# Patient Record
(demographics unavailable — no encounter records)

---

## 2024-11-05 NOTE — DISCUSSION/SUMMARY
[FreeTextEntry1] : 10 yo well, nml growth, ADHD, doing well on focalin improving toe walking, no longer receives PT evaluation for fine motor delays fasting labs ordered  vitamin D rx ADHD evaluation, sleep issues discussed, good appetite, focalin 15 mg in am, no need for additional in afternoon Continue balanced diet with all food groups. Brush teeth twice a day with toothbrush. Recommend visit to dentist. Help child to maintain consistent daily routines and sleep schedule. School discussed. Ensure home is safe. Teach child about personal safety. Use consistent, positive discipline. Limit screen time to no more than 2 hours per day. Encourage physical activity. Child needs to ride in a belt-positioning booster seat until  4 feet 9 inches has been reached and are between 8 and 12 years of age.   Return 1 year for routine well child check.

## 2024-11-05 NOTE — HISTORY OF PRESENT ILLNESS
[Yes] : Patient goes to dentist yearly [Premenarche] : premenarche [Eats meals with family] : eats meals with family [Has family members/adults to turn to for help] : has family members/adults to turn to for help [Is permitted and is able to make independent decisions] : Is permitted and is able to make independent decisions [Grade: ____] : Grade: [unfilled] [Normal Performance] : normal performance [Normal Behavior/Attention] : normal behavior/attention [Normal Homework] : normal homework [Eats regular meals including adequate fruits and vegetables] : eats regular meals including adequate fruits and vegetables [Drinks non-sweetened liquids] : drinks non-sweetened liquids  [Has friends] : has friends [Sleep Concerns] : no sleep concerns [de-identified] : reading extra help, OT weekly, doing well focalin 15 mg in am, not needing pm dose [de-identified] : can take 1-1.5 hours to sleep

## 2024-11-05 NOTE — PHYSICAL EXAM
